# Patient Record
Sex: MALE | Race: WHITE | NOT HISPANIC OR LATINO | Employment: FULL TIME | URBAN - METROPOLITAN AREA
[De-identification: names, ages, dates, MRNs, and addresses within clinical notes are randomized per-mention and may not be internally consistent; named-entity substitution may affect disease eponyms.]

---

## 2017-06-01 ENCOUNTER — HOSPITAL ENCOUNTER (OUTPATIENT)
Facility: HOSPITAL | Age: 42
Discharge: HOME | End: 2017-06-02
Attending: EMERGENCY MEDICINE | Admitting: EMERGENCY MEDICINE
Payer: OTHER GOVERNMENT

## 2017-06-01 DIAGNOSIS — I10 UNCONTROLLED HYPERTENSION: ICD-10-CM

## 2017-06-01 DIAGNOSIS — R07.9 CHEST PAIN IN ADULT: Primary | ICD-10-CM

## 2017-06-01 DIAGNOSIS — I10 ESSENTIAL HYPERTENSION: ICD-10-CM

## 2017-06-01 LAB
ALBUMIN SERPL BCP-MCNC: 4.2 G/DL
ALP SERPL-CCNC: 75 U/L
ALT SERPL W/O P-5'-P-CCNC: 55 U/L
ANION GAP SERPL CALC-SCNC: 9 MMOL/L
AST SERPL-CCNC: 33 U/L
BASOPHILS # BLD AUTO: 0.03 K/UL
BASOPHILS NFR BLD: 0.4 %
BILIRUB SERPL-MCNC: 0.4 MG/DL
BILIRUB UR QL STRIP: NEGATIVE
BUN SERPL-MCNC: 12 MG/DL
CALCIUM SERPL-MCNC: 9.6 MG/DL
CHLORIDE SERPL-SCNC: 108 MMOL/L
CLARITY UR: CLEAR
CO2 SERPL-SCNC: 24 MMOL/L
COLOR UR: YELLOW
CREAT SERPL-MCNC: 0.9 MG/DL
DIFFERENTIAL METHOD: ABNORMAL
EOSINOPHIL # BLD AUTO: 0.2 K/UL
EOSINOPHIL NFR BLD: 2.2 %
ERYTHROCYTE [DISTWIDTH] IN BLOOD BY AUTOMATED COUNT: 12.7 %
EST. GFR  (AFRICAN AMERICAN): >60 ML/MIN/1.73 M^2
EST. GFR  (NON AFRICAN AMERICAN): >60 ML/MIN/1.73 M^2
GLUCOSE SERPL-MCNC: 117 MG/DL
GLUCOSE UR QL STRIP: NEGATIVE
HCT VFR BLD AUTO: 42.8 %
HGB BLD-MCNC: 15.1 G/DL
HGB UR QL STRIP: NEGATIVE
KETONES UR QL STRIP: NEGATIVE
LEUKOCYTE ESTERASE UR QL STRIP: NEGATIVE
LYMPHOCYTES # BLD AUTO: 2.4 K/UL
LYMPHOCYTES NFR BLD: 32 %
MCH RBC QN AUTO: 31.1 PG
MCHC RBC AUTO-ENTMCNC: 35.3 %
MCV RBC AUTO: 88 FL
MONOCYTES # BLD AUTO: 0.6 K/UL
MONOCYTES NFR BLD: 8.6 %
NEUTROPHILS # BLD AUTO: 4.2 K/UL
NEUTROPHILS NFR BLD: 56.7 %
NITRITE UR QL STRIP: NEGATIVE
PH UR STRIP: 6 [PH] (ref 5–8)
PLATELET # BLD AUTO: 242 K/UL
PMV BLD AUTO: 10.8 FL
POTASSIUM SERPL-SCNC: 4 MMOL/L
PROT SERPL-MCNC: 7.6 G/DL
PROT UR QL STRIP: NEGATIVE
RBC # BLD AUTO: 4.85 M/UL
SODIUM SERPL-SCNC: 141 MMOL/L
SP GR UR STRIP: 1.02 (ref 1–1.03)
TROPONIN I SERPL DL<=0.01 NG/ML-MCNC: <0.006 NG/ML
TROPONIN I SERPL DL<=0.01 NG/ML-MCNC: <0.006 NG/ML
URN SPEC COLLECT METH UR: NORMAL
UROBILINOGEN UR STRIP-ACNC: NEGATIVE EU/DL
WBC # BLD AUTO: 7.35 K/UL

## 2017-06-01 PROCEDURE — 96374 THER/PROPH/DIAG INJ IV PUSH: CPT

## 2017-06-01 PROCEDURE — G0378 HOSPITAL OBSERVATION PER HR: HCPCS

## 2017-06-01 PROCEDURE — 25000003 PHARM REV CODE 250: Performed by: NURSE PRACTITIONER

## 2017-06-01 PROCEDURE — 81003 URINALYSIS AUTO W/O SCOPE: CPT

## 2017-06-01 PROCEDURE — 85025 COMPLETE CBC W/AUTO DIFF WBC: CPT

## 2017-06-01 PROCEDURE — 25000003 PHARM REV CODE 250: Performed by: INTERNAL MEDICINE

## 2017-06-01 PROCEDURE — 84484 ASSAY OF TROPONIN QUANT: CPT

## 2017-06-01 PROCEDURE — 80053 COMPREHEN METABOLIC PANEL: CPT

## 2017-06-01 PROCEDURE — 25000003 PHARM REV CODE 250: Performed by: EMERGENCY MEDICINE

## 2017-06-01 PROCEDURE — 36415 COLL VENOUS BLD VENIPUNCTURE: CPT

## 2017-06-01 PROCEDURE — 93005 ELECTROCARDIOGRAM TRACING: CPT

## 2017-06-01 PROCEDURE — 84484 ASSAY OF TROPONIN QUANT: CPT | Mod: 91

## 2017-06-01 PROCEDURE — 99285 EMERGENCY DEPT VISIT HI MDM: CPT | Mod: 25

## 2017-06-01 RX ORDER — ONDANSETRON 2 MG/ML
4 INJECTION INTRAMUSCULAR; INTRAVENOUS EVERY 8 HOURS PRN
Status: DISCONTINUED | OUTPATIENT
Start: 2017-06-01 | End: 2017-06-02 | Stop reason: HOSPADM

## 2017-06-01 RX ORDER — CLONIDINE HYDROCHLORIDE 0.1 MG/1
0.1 TABLET ORAL ONCE
Status: COMPLETED | OUTPATIENT
Start: 2017-06-01 | End: 2017-06-01

## 2017-06-01 RX ORDER — LISINOPRIL 10 MG/1
10 TABLET ORAL DAILY
COMMUNITY
End: 2017-07-29

## 2017-06-01 RX ORDER — LISINOPRIL 20 MG/1
20 TABLET ORAL DAILY
Status: DISCONTINUED | OUTPATIENT
Start: 2017-06-02 | End: 2017-06-02 | Stop reason: HOSPADM

## 2017-06-01 RX ORDER — SODIUM CHLORIDE 0.9 % (FLUSH) 0.9 %
3 SYRINGE (ML) INJECTION EVERY 8 HOURS PRN
Status: DISCONTINUED | OUTPATIENT
Start: 2017-06-01 | End: 2017-06-02 | Stop reason: HOSPADM

## 2017-06-01 RX ORDER — ASPIRIN 325 MG
325 TABLET ORAL DAILY
Status: DISCONTINUED | OUTPATIENT
Start: 2017-06-02 | End: 2017-06-02 | Stop reason: HOSPADM

## 2017-06-01 RX ORDER — ENALAPRILAT 1.25 MG/ML
0.62 INJECTION INTRAVENOUS
Status: COMPLETED | OUTPATIENT
Start: 2017-06-01 | End: 2017-06-01

## 2017-06-01 RX ORDER — ASPIRIN 325 MG
325 TABLET ORAL
Status: COMPLETED | OUTPATIENT
Start: 2017-06-01 | End: 2017-06-01

## 2017-06-01 RX ORDER — RAMELTEON 8 MG/1
8 TABLET ORAL NIGHTLY PRN
Status: DISCONTINUED | OUTPATIENT
Start: 2017-06-01 | End: 2017-06-02 | Stop reason: HOSPADM

## 2017-06-01 RX ORDER — MORPHINE SULFATE 10 MG/ML
2 INJECTION INTRAMUSCULAR; INTRAVENOUS; SUBCUTANEOUS EVERY 4 HOURS PRN
Status: DISCONTINUED | OUTPATIENT
Start: 2017-06-01 | End: 2017-06-02 | Stop reason: HOSPADM

## 2017-06-01 RX ORDER — LISINOPRIL 5 MG/1
10 TABLET ORAL DAILY
Status: DISCONTINUED | OUTPATIENT
Start: 2017-06-02 | End: 2017-06-01

## 2017-06-01 RX ADMIN — RAMELTEON 8 MG: 8 TABLET, FILM COATED ORAL at 10:06

## 2017-06-01 RX ADMIN — CLONIDINE HYDROCHLORIDE 0.1 MG: 0.1 TABLET ORAL at 10:06

## 2017-06-01 RX ADMIN — ASPIRIN 325 MG ORAL TABLET 325 MG: 325 PILL ORAL at 03:06

## 2017-06-01 RX ADMIN — ENALAPRILAT 0.62 MG: 1.25 INJECTION, SOLUTION INTRAVENOUS at 03:06

## 2017-06-01 NOTE — ED TRIAGE NOTES
Pt arrives per ems c/o intermittent chest tightness that radiates to his neck at times.  States he has HTN and his pressure has been getting higher as of late.  Today he went to the clinic today and they checked his pressure and stated it was really high so he came to the ED.  Inna patterson, ha, miles.

## 2017-06-01 NOTE — ED PROVIDER NOTES
"Encounter Date: 6/1/2017    SCRIBE #1 NOTE: I, Kym Moran, am scribing for, and in the presence of,  Jean Carlos Mcguire MD. I have scribed the following portions of the note - Other sections scribed: HPI/ROS.       History     Chief Complaint   Patient presents with    Chest Pain     Pt c/o intermitent chest pain off and on for the past 2 days, currently denies CP.      Review of patient's allergies indicates:  No Known Allergies  CC: Chest Pain    HPI: This 42 y.o. male with hypertension presents to the ED c/o a two week history of intermittent mid sternal chest pain that occasionally radiates up the left side of his neck.  The patient describes the pain as a "pressure" that is exacerbated with exertion.  [place] and found his blood pressure was high.  He has been compliant with his lisinopril for the past 10 years with no complications.  The patient denies shortness of breath, fever, nausea or any other associated symptoms.         The history is provided by the patient.     Past Medical History:   Diagnosis Date    Hypertension      Past Surgical History:   Procedure Laterality Date    SHOULDER SURGERY       History reviewed. No pertinent family history.  Social History   Substance Use Topics    Smoking status: Never Smoker    Smokeless tobacco: Current User     Types: Snuff    Alcohol use 2.4 oz/week     4 Cans of beer per week     Review of Systems   Constitutional: Negative for fever.   HENT: Negative for ear pain and sore throat.    Eyes: Negative for visual disturbance.   Respiratory: Negative for shortness of breath.    Cardiovascular: Positive for chest pain.   Gastrointestinal: Negative for abdominal pain, nausea and vomiting.   Genitourinary: Negative for dysuria.   Musculoskeletal: Negative for back pain and neck pain.   Neurological: Negative for headaches.   All other systems reviewed and are negative.      Physical Exam     Initial Vitals " [06/01/17 1420]   BP Pulse Resp Temp SpO2   (!) 182/126 87 18 98.7 °F (37.1 °C) 96 %     Physical Exam  Nursing note and vitals reviewed.  Constitutional: Well-appearing middle-age male in no obvious distress.  HENT:    Head: NC/AT    Eyes: Conjunctivae normal.  (-) scleral icterus.              Mouth/Throat: MMM   Neck: Neck supple, normal rom.  Cardiovascular: RRR   Pulmonary/Chest: CTAB   Abdomen:  Soft, ND/NT.  (-) CVA tenderness.  Musculoskeletal:  FROM of all major joints. No apparent edema or tenderness.  Neurological: A&O x4.  No acute focal motor deficits.    Skin: Skin is intact, warm and dry.  No rash.  Psychiatric: normal mood and affect.      ED Course   Procedures  Labs Reviewed   CBC W/ AUTO DIFFERENTIAL - Abnormal; Notable for the following:        Result Value    MCH 31.1 (*)     All other components within normal limits   COMPREHENSIVE METABOLIC PANEL - Abnormal; Notable for the following:     Glucose 117 (*)     ALT 55 (*)     All other components within normal limits   TROPONIN I   URINALYSIS     EKG Readings: (Independently Interpreted)   Initial Reading: No STEMI.   Normal sinus rhythm, rate 81, normal axis/intervals, no ST/T-wave abnormalities.     X-Rays:   Independently Interpreted Readings:   Chest X-Ray: Normal heart size.  No infiltrates.  No acute abnormalities.     Medical Decision Making:   History:   I obtained history from: someone other than patient.  Old Medical Records: I decided to obtain old medical records.  Old Records Summarized: records from clinic visits and other records.  Independently Interpreted Test(s):   I have ordered and independently interpreted X-rays - see prior notes.  I have ordered and independently interpreted EKG Reading(s) - see prior notes  Clinical Tests:   Lab Tests: Ordered and Reviewed  Radiological Study: Ordered and Reviewed  Medical Tests: Ordered and Reviewed    Differential Diagnosis:   Initial differential diagnosis includes but is not limited  to... ACS/MI, aortic dissection, pericarditis, pericardial effusion, PE, pneumothorax, pneumonia, costochondritis, gastritis, GERD, pancreatitis.     Additional Medical Decision Making:   Emergent evaluation of a 42 y.o. male with history of hypertension who presents to the Emergency Department via EMS complaining of intermittent left-sided/substernal chest pain for the past 3-4 weeks.  Physical exam unremarkable.  VS concerning for hypertension.  Basic labs including cardiac enzymes are unremarkable.   There is no evidence of acute ischemia and/or dysrhythmia on EKG - troponin negative.  Despite his unremarkable workup thus far, the remains concern for ACS.  Consequently,  he will be admitted to medicine for further evaluation and management including serial cardiac enzymes/EKGs and continuous cardiac monitoring in an effort to r/o ACS.             Scribe Attestation:   Scribe #1: I performed the above scribed service and the documentation accurately describes the services I performed. I attest to the accuracy of the note.    Attending Attestation:           Physician Attestation for Scribe:  Physician Attestation Statement for Scribe #1: I, Jean Carlos Mcguire MD, reviewed documentation, as scribed by Kym Moran in my presence, and it is both accurate and complete.                 ED Course     Clinical Impression:   The primary encounter diagnosis was Chest pain in adult. A diagnosis of Uncontrolled hypertension was also pertinent to this visit.          Jean Carlos Mcguire MD  06/01/17 1510       Jean Carlos Mcguire MD  06/01/17 0661

## 2017-06-02 VITALS
HEART RATE: 65 BPM | TEMPERATURE: 99 F | OXYGEN SATURATION: 96 % | DIASTOLIC BLOOD PRESSURE: 66 MMHG | WEIGHT: 130 LBS | SYSTOLIC BLOOD PRESSURE: 125 MMHG | BODY MASS INDEX: 17.61 KG/M2 | RESPIRATION RATE: 18 BRPM | HEIGHT: 72 IN

## 2017-06-02 LAB
ALBUMIN SERPL BCP-MCNC: 3.9 G/DL
ALP SERPL-CCNC: 65 U/L
ALT SERPL W/O P-5'-P-CCNC: 52 U/L
ANION GAP SERPL CALC-SCNC: 9 MMOL/L
AST SERPL-CCNC: 28 U/L
BASOPHILS # BLD AUTO: 0.02 K/UL
BASOPHILS NFR BLD: 0.2 %
BILIRUB SERPL-MCNC: 0.6 MG/DL
BUN SERPL-MCNC: 18 MG/DL
CALCIUM SERPL-MCNC: 9.3 MG/DL
CHLORIDE SERPL-SCNC: 106 MMOL/L
CHOLEST/HDLC SERPL: 5.6 {RATIO}
CO2 SERPL-SCNC: 26 MMOL/L
CREAT SERPL-MCNC: 0.9 MG/DL
DIASTOLIC DYSFUNCTION: NO
DIFFERENTIAL METHOD: NORMAL
EOSINOPHIL # BLD AUTO: 0.3 K/UL
EOSINOPHIL NFR BLD: 2.8 %
ERYTHROCYTE [DISTWIDTH] IN BLOOD BY AUTOMATED COUNT: 12.8 %
EST. GFR  (AFRICAN AMERICAN): >60 ML/MIN/1.73 M^2
EST. GFR  (NON AFRICAN AMERICAN): >60 ML/MIN/1.73 M^2
GLUCOSE SERPL-MCNC: 103 MG/DL
HCT VFR BLD AUTO: 42.6 %
HDL/CHOLESTEROL RATIO: 18 %
HDLC SERPL-MCNC: 200 MG/DL
HDLC SERPL-MCNC: 36 MG/DL
HGB BLD-MCNC: 14.3 G/DL
INR PPP: 1.1
LDLC SERPL CALC-MCNC: 124 MG/DL
LYMPHOCYTES # BLD AUTO: 3.1 K/UL
LYMPHOCYTES NFR BLD: 34.7 %
MCH RBC QN AUTO: 30.2 PG
MCHC RBC AUTO-ENTMCNC: 33.6 %
MCV RBC AUTO: 90 FL
MONOCYTES # BLD AUTO: 0.7 K/UL
MONOCYTES NFR BLD: 8.1 %
NEUTROPHILS # BLD AUTO: 4.8 K/UL
NEUTROPHILS NFR BLD: 54.2 %
NONHDLC SERPL-MCNC: 164 MG/DL
PLATELET # BLD AUTO: 227 K/UL
PMV BLD AUTO: 10.9 FL
POTASSIUM SERPL-SCNC: 4 MMOL/L
PROT SERPL-MCNC: 6.9 G/DL
PROTHROMBIN TIME: 11.5 SEC
RBC # BLD AUTO: 4.74 M/UL
SODIUM SERPL-SCNC: 141 MMOL/L
TRIGL SERPL-MCNC: 200 MG/DL
TROPONIN I SERPL DL<=0.01 NG/ML-MCNC: <0.006 NG/ML
TSH SERPL DL<=0.005 MIU/L-ACNC: 2.34 UIU/ML
WBC # BLD AUTO: 8.88 K/UL

## 2017-06-02 PROCEDURE — 80053 COMPREHEN METABOLIC PANEL: CPT

## 2017-06-02 PROCEDURE — 84443 ASSAY THYROID STIM HORMONE: CPT

## 2017-06-02 PROCEDURE — 93017 CV STRESS TEST TRACING ONLY: CPT

## 2017-06-02 PROCEDURE — 85610 PROTHROMBIN TIME: CPT

## 2017-06-02 PROCEDURE — 85025 COMPLETE CBC W/AUTO DIFF WBC: CPT

## 2017-06-02 PROCEDURE — 36415 COLL VENOUS BLD VENIPUNCTURE: CPT

## 2017-06-02 PROCEDURE — 80061 LIPID PANEL: CPT

## 2017-06-02 PROCEDURE — 25000003 PHARM REV CODE 250: Performed by: EMERGENCY MEDICINE

## 2017-06-02 PROCEDURE — 84484 ASSAY OF TROPONIN QUANT: CPT

## 2017-06-02 PROCEDURE — 93018 CV STRESS TEST I&R ONLY: CPT | Mod: ,,, | Performed by: INTERNAL MEDICINE

## 2017-06-02 PROCEDURE — G0378 HOSPITAL OBSERVATION PER HR: HCPCS

## 2017-06-02 PROCEDURE — 93016 CV STRESS TEST SUPVJ ONLY: CPT | Mod: ,,, | Performed by: INTERNAL MEDICINE

## 2017-06-02 PROCEDURE — 78452 HT MUSCLE IMAGE SPECT MULT: CPT | Mod: 26,,, | Performed by: INTERNAL MEDICINE

## 2017-06-02 PROCEDURE — 25000003 PHARM REV CODE 250: Performed by: NURSE PRACTITIONER

## 2017-06-02 RX ORDER — LISINOPRIL 20 MG/1
20 TABLET ORAL DAILY
Qty: 30 TABLET | Refills: 3 | Status: SHIPPED | OUTPATIENT
Start: 2017-06-02 | End: 2018-06-02

## 2017-06-02 RX ADMIN — ASPIRIN 325 MG ORAL TABLET 325 MG: 325 PILL ORAL at 12:06

## 2017-06-02 RX ADMIN — LISINOPRIL 20 MG: 20 TABLET ORAL at 12:06

## 2017-06-02 NOTE — PLAN OF CARE
Problem: Patient Care Overview  Goal: Plan of Care Review  Outcome: Ongoing (interventions implemented as appropriate)   06/01/17 2117   Coping/Psychosocial   Plan Of Care Reviewed With patient   Pt remained free of falls during current shift. Monitoring pt's blood pressure and clonidine added to medication. Pt remained NPO since midnight and will have a stress test in AM. Denied pain and did not receive any prn pain medications. Plan of care and fall precautions reviewed with pt and verbalized understanding. Bed locked, lowered, SR up x2 and call light placed within reach.

## 2017-06-02 NOTE — PLAN OF CARE
06/02/17 1411   Final Note   Assessment Type Final Discharge Note   Discharge Disposition Home   Discharge planning education complete? Yes   Hospital Follow Up  Appt(s) scheduled? Yes   Discharge plans and expectations educations in teach back method with documentation complete? Yes   Offered [facility] -- Bedside Delivery? n/a   Discharge/Hospital Encounter Summary to (non-[facility]) PCP n/a   Referral to Outpatient Case Management complete? n/a   Referral to / orders for Home Health Complete? n/a   30 day supply of medicines given at discharge, if documented non-compliance / non-adherence? n/a   Any social issues identified prior to discharge? n/a   Did you assess the readiness or willingness of the family or caregiver to support self management of care? n/a   Right Care Referral Info   Post Acute Recommendation No Care     Follow-up Information     Schedule an appointment as soon as possible [place]               Call placed to pts nurse Chand to notify her that all CM needs have been addressed and that she can proceed with nursing d/c instructions to complete d/c processs

## 2017-06-02 NOTE — NURSING
Pt received from ED assisted by transport via wheelchair. Ambulated to bed and bed locked, lowered, SR up x2. Vitals obtained and documented. Tele monitor initiated. Pt is AAO x4. Resp are even, unlabored, diminished on room air. Pt is presenting SR on tele monitor. Abd is contour, active x4 quads and reports having LBM on 6/1. Skin is clean, dry and intact. 18g PIV to RH saline locked. Pt denied pain at current time and is in NAD. Assessment completed and documented. See doc flow sheet. Plan of care reviewed with pt and pt verbalized understanding. Call light placed within reach. Will continue to monitor pt closely.

## 2017-06-02 NOTE — ASSESSMENT & PLAN NOTE
Uncontrolled on current medication regimen. Will continue lisinopril and adjust for better BP control. Clonidine x 1 dose.

## 2017-06-02 NOTE — SUBJECTIVE & OBJECTIVE
Past Medical History:   Diagnosis Date    Hypertension        Past Surgical History:   Procedure Laterality Date    SHOULDER SURGERY Right        Review of patient's allergies indicates:  No Known Allergies    No current facility-administered medications on file prior to encounter.      No current outpatient prescriptions on file prior to encounter.     Family History     Problem Relation (Age of Onset)    Heart disease Father    No Known Problems Mother        Social History Main Topics    Smoking status: Never Smoker    Smokeless tobacco: Current User     Types: Snuff    Alcohol use 2.4 oz/week     4 Cans of beer per week      Comment: 4 nights/week    Drug use: No    Sexual activity: Yes     Review of Systems   Constitutional: Negative for chills, diaphoresis and fever.   HENT: Negative for congestion, postnasal drip, sinus pressure and sore throat.    Eyes: Negative for visual disturbance.   Respiratory: Negative for cough, chest tightness, shortness of breath and wheezing.    Cardiovascular: Positive for chest pain (left sided chset pain radiating up neck ). Negative for palpitations and leg swelling.   Gastrointestinal: Negative for abdominal distention, abdominal pain, blood in stool, constipation, diarrhea, nausea and vomiting.   Endocrine: Negative.    Genitourinary: Negative for dysuria.   Musculoskeletal: Negative.    Skin: Negative.    Allergic/Immunologic: Negative.    Neurological: Positive for headaches (he assoicates with elevated BP). Negative for dizziness, weakness and numbness.   Hematological: Negative.    Psychiatric/Behavioral: Negative.      Objective:     Vital Signs (Most Recent):  Temp: 98.5 °F (36.9 °C) (06/01/17 2124)  Pulse: 73 (06/01/17 2124)  Resp: 18 (06/01/17 2124)  BP: (!) 166/110 (06/01/17 2153)  SpO2: 97 % (06/01/17 2124) Vital Signs (24h Range):  Temp:  [98.4 °F (36.9 °C)-98.7 °F (37.1 °C)] 98.5 °F (36.9 °C)  Pulse:  [62-87] 73  Resp:  [18] 18  SpO2:  [96 %-97 %] 97  %  BP: (136-182)/() 166/110     Weight: 59 kg (130 lb)  Body mass index is 17.63 kg/m².    Physical Exam   Constitutional: He is oriented to person, place, and time. He appears well-developed and well-nourished. He is cooperative.  Non-toxic appearance. No distress.   HENT:   Head: Normocephalic and atraumatic.   Eyes: Conjunctivae and lids are normal. Pupils are equal, round, and reactive to light.   Neck: Trachea normal, normal range of motion and full passive range of motion without pain. Neck supple. Normal carotid pulses and no JVD present. No tracheal deviation present. No thyroid mass and no thyromegaly present.   Cardiovascular: Normal rate, regular rhythm, S1 normal, S2 normal, normal heart sounds and normal pulses.    Pulmonary/Chest: Effort normal and breath sounds normal. No stridor.   Abdominal: Soft. Normal appearance and bowel sounds are normal. There is no tenderness.   Musculoskeletal: Normal range of motion.   Neurological: He is alert and oriented to person, place, and time. He has normal strength. No cranial nerve deficit or sensory deficit.   Skin: Skin is warm, dry and intact. He is not diaphoretic. No cyanosis. Nails show no clubbing.   Psychiatric: He has a normal mood and affect. His speech is normal and behavior is normal. Judgment and thought content normal. Cognition and memory are normal.        Significant Labs:   CBC:   Recent Labs  Lab 06/01/17  1455   WBC 7.35   HGB 15.1   HCT 42.8        CMP:   Recent Labs  Lab 06/01/17  1455      K 4.0      CO2 24   *   BUN 12   CREATININE 0.9   CALCIUM 9.6   PROT 7.6   ALBUMIN 4.2   BILITOT 0.4   ALKPHOS 75   AST 33   ALT 55*   ANIONGAP 9   EGFRNONAA >60     Troponin:   Recent Labs  Lab 06/01/17  1455   TROPONINI <0.006     TSH: No results for input(s): TSH in the last 4320 hours.    Significant Imaging:   Imaging Results          X-Ray Chest AP Portable (Final result)  Result time 06/01/17 15:00:09    Final result  by Robe Kim MD (06/01/17 15:00:09)                 Impression:     No acute cardiopulmonary disease      Electronically signed by: ROBE KIM MD  Date:     06/01/17  Time:    15:00              Narrative:    Portable AP view of the chest was obtained.  Comparison -- none. Heart size is normal. Mediastinal contour is unremarkable. The lungs are expanded and clear. No lung consolidation, pleural effusion, or pneumothorax is seen. The skeletal structures are intact.

## 2017-06-02 NOTE — HPI
"Moris Jones is a 42 y.o. male hypertension who presented [place] the ED [place] of elevate BP.  He also reports HA and intermittent left sided chest pain radiating up into neck 4/5. He describes the chest pain as "tightness" and states pain is exacerbated with activity and resolved with rest. He denies fever, chills, diaphoresis, palpitations, NVD, recent trauma and illness. States he's been on lisinopril 10 mg for ~ 10 years and blood pressures are usually controlled. He tells me last couple of weeks have busy and thinks this maybe contributory.  Reports he chews smokless tobacco x 20+ years and is a moderate drinker (4 cans of beer) 4 days a week.  Pertinent FH - dad(d) - MI. Found in ED to have non-ischemic EKG.  Initial troponin negative. BNP wnl. CXR is non-revealing.  Admitted to OBS for ACS rule out.  No previous cardiac issues or recent w/u. He denies recurrent CP and is non-distressed.                                             "

## 2017-06-02 NOTE — PLAN OF CARE
06/02/17 1000   Discharge Assessment   Assessment Type Discharge Planning Assessment   Confirmed/corrected address and phone number on facesheet? Yes   Assessment information obtained from? Patient   Prior to hospitilization cognitive status: Alert/Oriented   Prior to hospitalization functional status: Independent   Current cognitive status: Alert/Oriented   Current Functional Status: Independent   Arrived From home or self-care   Lives With spouse   Able to Return to Prior Arrangements yes   Is patient able to care for self after discharge? Yes   How many people do you have in your home that can help with your care after discharge? 1   Who are your caregiver(s) and their phone number(s)? jose-Sandra- [phone]   Patient's perception of discharge disposition home or selfcare   Readmission Within The Last 30 Days no previous admission in last 30 days   Patient currently being followed by outpatient case management? No   Patient currently receives home health services? No   Does the patient currently use HME? No   Patient currently receives private duty nursing? N/A   Patient currently receives any other outside agency services? No   Equipment Currently Used at Home none   Do you have any problems affording any of your prescribed medications? No   Is the patient taking medications as prescribed? yes   Do you have any financial concerns preventing you from receiving the healthcare you need? No   Does the patient have transportation to healthcare appointments? Yes   Transportation Available car;family or friend will provide   On Dialysis? No   Does the patient receive services at the Coumadin Clinic? No   Are there any open cases? No   Discharge Plan A Home with family   Patient/Family In Agreement With Plan yes

## 2017-06-02 NOTE — H&P
"[facility] Medicine  History & Physical    Patient Name: Moris Jones  MRN: [id number]  Admission Date: 6/1/2017  Attending Physician: Berkley Bowers MD   Primary Care Provider: No primary care provider on file.         Patient information was obtained from patient and ER records.     Subjective:     Principal Problem:Chest pain in adult    Chief Complaint:   Chief Complaint   Patient presents with    Chest Pain     Pt c/o intermitent chest pain off and on for the past 2 days, currently denies CP.         HPI: Moris Jones is a 42 y.o. male hypertension who presented to the ED via EMS [place] [employer] [place] for evaluation of elevate BP.  He also reports HA and intermittent left sided chest pain radiating up into neck 4/5. He describes the chest pain as "tightness" and states pain is exacerbated with activity and resolved with rest. He denies fever, chills, diaphoresis, palpitations, NVD, recent trauma and illness. States he's been on lisinopril 10 mg for ~ 10 years and blood pressures are usually controlled. He tells me last couple of weeks have busy and thinks this maybe contributory.  Reports he chews smokless tobacco x 20+ years and is a moderate drinker (4 cans of beer) 4 days a week.  Pertinent FH - dad(d) - MI. Found in ED to have non-ischemic EKG.  Initial troponin negative. BNP wnl. CXR is non-revealing.  Admitted to OBS for ACS rule out.  No previous cardiac issues or recent w/u. He denies recurrent CP and is non-distressed.                                               Past Medical History:   Diagnosis Date    Hypertension        Past Surgical History:   Procedure Laterality Date    SHOULDER SURGERY Right        Review of patient's allergies indicates:  No Known Allergies    No current facility-administered medications on file prior to encounter.      No current outpatient prescriptions on file prior to encounter.     Family History     Problem Relation (Age of Onset) "    Heart disease Father    No Known Problems Mother        Social History Main Topics    Smoking status: Never Smoker    Smokeless tobacco: Current User     Types: Snuff    Alcohol use 2.4 oz/week     4 Cans of beer per week      Comment: 4 nights/week    Drug use: No    Sexual activity: Yes     Review of Systems   Constitutional: Negative for chills, diaphoresis and fever.   HENT: Negative for congestion, postnasal drip, sinus pressure and sore throat.    Eyes: Negative for visual disturbance.   Respiratory: Negative for cough, chest tightness, shortness of breath and wheezing.    Cardiovascular: Positive for chest pain (left sided chset pain radiating up neck ). Negative for palpitations and leg swelling.   Gastrointestinal: Negative for abdominal distention, abdominal pain, blood in stool, constipation, diarrhea, nausea and vomiting.   Endocrine: Negative.    Genitourinary: Negative for dysuria.   Musculoskeletal: Negative.    Skin: Negative.    Allergic/Immunologic: Negative.    Neurological: Positive for headaches (he assoicates with elevated BP). Negative for dizziness, weakness and numbness.   Hematological: Negative.    Psychiatric/Behavioral: Negative.      Objective:     Vital Signs (Most Recent):  Temp: 98.5 °F (36.9 °C) (06/01/17 2124)  Pulse: 73 (06/01/17 2124)  Resp: 18 (06/01/17 2124)  BP: (!) 166/110 (06/01/17 2153)  SpO2: 97 % (06/01/17 2124) Vital Signs (24h Range):  Temp:  [98.4 °F (36.9 °C)-98.7 °F (37.1 °C)] 98.5 °F (36.9 °C)  Pulse:  [62-87] 73  Resp:  [18] 18  SpO2:  [96 %-97 %] 97 %  BP: (136-182)/() 166/110     Weight: 59 kg (130 lb)  Body mass index is 17.63 kg/m².    Physical Exam   Constitutional: He is oriented to person, place, and time. He appears well-developed and well-nourished. He is cooperative.  Non-toxic appearance. No distress.   HENT:   Head: Normocephalic and atraumatic.   Eyes: Conjunctivae and lids are normal. Pupils are equal, round, and reactive to light.    Neck: Trachea normal, normal range of motion and full passive range of motion without pain. Neck supple. Normal carotid pulses and no JVD present. No tracheal deviation present. No thyroid mass and no thyromegaly present.   Cardiovascular: Normal rate, regular rhythm, S1 normal, S2 normal, normal heart sounds and normal pulses.    Pulmonary/Chest: Effort normal and breath sounds normal. No stridor.   Abdominal: Soft. Normal appearance and bowel sounds are normal. There is no tenderness.   Musculoskeletal: Normal range of motion.   Neurological: He is alert and oriented to person, place, and time. He has normal strength. No cranial nerve deficit or sensory deficit.   Skin: Skin is warm, dry and intact. He is not diaphoretic. No cyanosis. Nails show no clubbing.   Psychiatric: He has a normal mood and affect. His speech is normal and behavior is normal. Judgment and thought content normal. Cognition and memory are normal.        Significant Labs:   CBC:   Recent Labs  Lab 06/01/17  1455   WBC 7.35   HGB 15.1   HCT 42.8        CMP:   Recent Labs  Lab 06/01/17  1455      K 4.0      CO2 24   *   BUN 12   CREATININE 0.9   CALCIUM 9.6   PROT 7.6   ALBUMIN 4.2   BILITOT 0.4   ALKPHOS 75   AST 33   ALT 55*   ANIONGAP 9   EGFRNONAA >60     Troponin:   Recent Labs  Lab 06/01/17  1455   TROPONINI <0.006     TSH: No results for input(s): TSH in the last 4320 hours.    Significant Imaging:   Imaging Results          X-Ray Chest AP Portable (Final result)  Result time 06/01/17 15:00:09    Final result by Robe Kim MD (06/01/17 15:00:09)                 Impression:     No acute cardiopulmonary disease      Electronically signed by: ROBE KIM MD  Date:     06/01/17  Time:    15:00              Narrative:    Portable AP view of the chest was obtained.  Comparison -- none. Heart size is normal. Mediastinal contour is unremarkable. The lungs are expanded and clear. No lung consolidation,  pleural effusion, or pneumothorax is seen. The skeletal structures are intact.                                  Assessment/Plan:     * Chest pain in adult    Admitted for rule out ACS.  Ischemic work-up (-) to date. EKG is non-ischemic. Initial troponin level negative. BNP wnl. Plan for continuous cardiac monitoring. Continue to trend serial troponins q6 hours X 2. ASA/NTG 0.4 mg SL PRN CP. Exercise stress in AM. NPO after midnight. Consider cardiology consult if abnormal.               Essential hypertension    Uncontrolled on current medication regimen. Will continue lisinopril and adjust for better BP control. Clonidine x 1 dose.              VTE Risk Mitigation         Ordered     Medium Risk of VTE  Once      06/01/17 2050     Place sequential compression device  Until discontinued      06/01/17 2050     Place DAVONTE hose  Until discontinued      06/01/17 2050        PERLITA Mederos  Department of Hospital Medicine   [facility]

## 2017-06-02 NOTE — ASSESSMENT & PLAN NOTE
Admitted for rule out ACS.  Ischemic work-up (-) to date. EKG is non-ischemic. Initial troponin level negative. BNP wnl. Plan for continuous cardiac monitoring. Continue to trend serial troponins q6 hours X 2. ASA/NTG 0.4 mg SL PRN CP. Exercise stress in AM. NPO after midnight. Consider cardiology consult if abnormal.

## 2017-06-02 NOTE — ED NOTES
Received report from [facility]. 1st contact with pt. Pt AAO x 3, REU, skin warm, dry and intact. Side rails up x 2, bed in low position, wheels locked. Call bell within reach. Pt denies chest pain rated 0/10. Pt BP: 149/93. Pt has been updated on his care. Family at bedside.

## 2017-06-03 PROBLEM — I10 ESSENTIAL HYPERTENSION: Status: ACTIVE | Noted: 2017-06-03

## 2017-06-03 NOTE — DISCHARGE SUMMARY
"[facility] Hospital Medicine  Discharge Summary      Patient Name: Moris Jones  MRN: [id number]  Admission Date: 6/1/2017  Hospital Length of Stay: 0 days  Discharge Date and Time: 6/2/2017  3:17 PM  Attending Physician: No att. providers found   Discharging Provider: Brandi Stringer NP  Primary Care Provider: Primary Doctor No      HPI:   Moris Jones is a 42 y.o. male hypertension who presented to the ED via EMS from medic on the [facility] for evaluation of elevate BP.  He also reports HA and intermittent left sided chest pain radiating up into neck 4/5. He describes the chest pain as "tightness" and states pain is exacerbated with activity and resolved with rest. He denies fever, chills, diaphoresis, palpitations, NVD, recent trauma and illness. States he's been on lisinopril 10 mg for ~ 10 years and blood pressures are usually controlled. He tells me last couple of weeks have busy and thinks this maybe contributory.  Reports he chews smokless tobacco x 20+ years and is a moderate drinker (4 cans of beer) 4 days a week.  Pertinent FH - dad(d) - MI. Found in ED to have non-ischemic EKG.  Initial troponin negative. BNP wnl. CXR is non-revealing.  Admitted to OBS for ACS rule out.  No previous cardiac issues or recent w/u. He denies recurrent CP and is non-distressed.                                               * No surgery found *      Indwelling Lines/Drains at time of discharge:   Lines/Drains/Airways          No matching active lines, drains, or airways        Hospital Course:   Patient admitted for chest pain ACS ruled out.  NST no evidence of ischemia with LVEF 61%. Blood pressure improved with antihypertensive adjustment. Increase lisinopril 10 to 20 mg daily and instruct low salt diet. Keep blood pressure log and bring to PCP at [facility]. Patient stable and ready for discharge.      Consults:     Significant Diagnostic Studies: Labs: All labs within the past 24 hours have been " reviewed    Pending Diagnostic Studies:     Procedure Component Value Units Date/Time    NM Myocardial Perfusion Spect Multi Exer [[id number]] Resulted:  06/02/17 0844    Order Status:  Sent Lab Status:  In process Updated:  06/02/17 1105        Final Active Diagnoses:    Diagnosis Date Noted POA    PRINCIPAL PROBLEM:  Chest pain in adult [R07.9] 06/01/2017 Yes    Essential hypertension [I10] 06/01/2017 Unknown      Problems Resolved During this Admission:    Diagnosis Date Noted Date Resolved POA      No new Assessment & Plan notes have been filed under this hospital service since the last note was generated.  Service: Hospital Medicine      Discharged Condition: good    Disposition: Home or Self Care    Follow Up:  Follow-up Information     Schedule an appointment as soon as possible [place]               Patient Instructions:     Diet general   Order Specific Question Answer Comments   Additional restrictions: Cardiac (Low Na/Chol)      Activity as tolerated     Call MD for:  severe uncontrolled pain     Call MD for:  difficulty breathing or increased cough       Medications:  Reconciled Home Medications:   Discharge Medication List as of 6/2/2017  2:51 PM      CONTINUE these medications which have CHANGED    Details   !! lisinopril (PRINIVIL,ZESTRIL) 20 MG tablet Take 1 tablet (20 mg total) by mouth once daily., Starting Fri 6/2/2017, Until Sat 6/2/2018, Print       !! - Potential duplicate medications found. Please discuss with provider.      CONTINUE these medications which have NOT CHANGED    Details   !! lisinopril 10 MG tablet Take 10 mg by mouth once daily., Historical Med       !! - Potential duplicate medications found. Please discuss with provider.        Time spent on the discharge of patient: 20 minutes    Brandi Stringer NP  Department of Hospital Medicine  [facility]

## 2017-06-03 NOTE — HOSPITAL COURSE
Patient admitted for chest pain ACS ruled out.  NST no evidence of ischemia with LVEF 61%. Blood pressure improved with antihypertensive adjustment. Increase lisinopril 10 to 20 mg daily and instruct low salt diet. Keep blood pressure log and bring to PCP at [facility]. Patient stable and ready for discharge.

## 2017-07-29 ENCOUNTER — HOSPITAL ENCOUNTER (EMERGENCY)
Facility: HOSPITAL | Age: 42
Discharge: HOME | End: 2017-07-29
Attending: EMERGENCY MEDICINE
Payer: OTHER GOVERNMENT

## 2017-07-29 VITALS
BODY MASS INDEX: 32.51 KG/M2 | WEIGHT: 240 LBS | RESPIRATION RATE: 20 BRPM | OXYGEN SATURATION: 98 % | SYSTOLIC BLOOD PRESSURE: 123 MMHG | HEART RATE: 75 BPM | TEMPERATURE: 99 F | HEIGHT: 72 IN | DIASTOLIC BLOOD PRESSURE: 70 MMHG

## 2017-07-29 DIAGNOSIS — M54.9 BACK PAIN, UNSPECIFIED BACK LOCATION, UNSPECIFIED BACK PAIN LATERALITY, UNSPECIFIED CHRONICITY: Primary | ICD-10-CM

## 2017-07-29 PROCEDURE — 96372 THER/PROPH/DIAG INJ SC/IM: CPT

## 2017-07-29 PROCEDURE — 63600175 PHARM REV CODE 636 W HCPCS: Performed by: NURSE PRACTITIONER

## 2017-07-29 PROCEDURE — 99283 EMERGENCY DEPT VISIT LOW MDM: CPT | Mod: 25

## 2017-07-29 RX ORDER — PREDNISONE 20 MG/1
60 TABLET ORAL
Status: COMPLETED | OUTPATIENT
Start: 2017-07-29 | End: 2017-07-29

## 2017-07-29 RX ORDER — IBUPROFEN 200 MG
200 TABLET ORAL EVERY 6 HOURS PRN
COMMUNITY

## 2017-07-29 RX ORDER — PREDNISONE 20 MG/1
TABLET ORAL
Qty: 18 TABLET | Refills: 0 | Status: SHIPPED | OUTPATIENT
Start: 2017-07-29

## 2017-07-29 RX ORDER — KETOROLAC TROMETHAMINE 30 MG/ML
30 INJECTION, SOLUTION INTRAMUSCULAR; INTRAVENOUS
Status: COMPLETED | OUTPATIENT
Start: 2017-07-29 | End: 2017-07-29

## 2017-07-29 RX ORDER — HYDROCODONE BITARTRATE AND ACETAMINOPHEN 10; 325 MG/1; MG/1
1 TABLET ORAL EVERY 6 HOURS PRN
Qty: 10 TABLET | Refills: 0 | Status: SHIPPED | OUTPATIENT
Start: 2017-07-29

## 2017-07-29 RX ORDER — METHOCARBAMOL 500 MG/1
1000 TABLET, FILM COATED ORAL 3 TIMES DAILY
Qty: 30 TABLET | Refills: 0 | Status: SHIPPED | OUTPATIENT
Start: 2017-07-29 | End: 2017-08-03

## 2017-07-29 RX ORDER — ORPHENADRINE CITRATE 30 MG/ML
60 INJECTION INTRAMUSCULAR; INTRAVENOUS
Status: COMPLETED | OUTPATIENT
Start: 2017-07-29 | End: 2017-07-29

## 2017-07-29 RX ADMIN — KETOROLAC TROMETHAMINE 30 MG: 30 INJECTION, SOLUTION INTRAMUSCULAR at 07:07

## 2017-07-29 RX ADMIN — ORPHENADRINE CITRATE 30 MG: 30 INJECTION INTRAMUSCULAR; INTRAVENOUS at 07:07

## 2017-07-29 RX ADMIN — PREDNISONE 60 MG: 20 TABLET ORAL at 07:07

## 2017-07-29 NOTE — ED TRIAGE NOTES
Right lower back pain since last pm swimming has back problems had MRI and is scheduled for injections in August no radiation of pain

## 2017-07-29 NOTE — ED PROVIDER NOTES
"Encounter Date: 7/29/2017    SCRIBE #1 NOTE: I, Anny Bravo, am scribing for, and in the presence of,  Lanette Crawford NP. I have scribed the following portions of the note - Other sections scribed: HPI and ROS.       History     Chief Complaint   Patient presents with    Back Pain     " I started having lower back pain last night after swimming in the pool."      Chief Complaint: Back Pain    HPI: This 42 y.o. Male with HTN and a hx of chronic back pain presents to the ED c/o lower right side back pain. Patient has been suffering for symptoms x years. He was given a MRI in the past which presented "buldging disc". He states pain is normally mild and is treated with motrin.  He is scheduled for pain management (injections) in Aug. However, patient reports exacerbating symptoms last night after swimming and doing other relatively physical task throughout the day yesterday. Pain is now constant, severe and worse with positional movement.  Pt attempted motrin this am with no relief.  States "thats usually all it takes". Pain is non radiating. He denies numbness, weakness, bowel or bladder incontinence.       The history is provided by the patient. No  was used.     Review of patient's allergies indicates:  No Known Allergies  Past Medical History:   Diagnosis Date    Back problem     Hypertension      Past Surgical History:   Procedure Laterality Date    SHOULDER SURGERY Right      Family History   Problem Relation Age of Onset    No Known Problems Mother     Heart disease Father      Social History   Substance Use Topics    Smoking status: Never Smoker    Smokeless tobacco: Current User     Types: Snuff    Alcohol use 2.4 oz/week     4 Cans of beer per week      Comment: 4 nights/week     Review of Systems   Constitutional: Negative for chills and fever.   HENT: Negative for ear pain and sore throat.    Eyes: Negative for pain.   Respiratory: Negative for cough and shortness of breath. "    Genitourinary: Negative for dysuria.   Musculoskeletal: Positive for back pain. Myalgias: arm or leg pain.   Skin: Negative for rash.   Neurological: Negative for weakness and numbness.       Physical Exam     Initial Vitals [07/29/17 0636]   BP Pulse Resp Temp SpO2   123/70 75 20 99 °F (37.2 °C) 98 %      MAP       87.67         Physical Exam    Nursing note and vitals reviewed.  Constitutional: He appears well-developed and well-nourished.   HENT:   Head: Normocephalic.   Eyes: Conjunctivae and EOM are normal. Pupils are equal, round, and reactive to light.   Neck: Normal range of motion. Neck supple.   Musculoskeletal: Normal range of motion. He exhibits no edema or tenderness.   Neurological: He is alert and oriented to person, place, and time. He has normal strength and normal reflexes. He displays normal reflexes. No cranial nerve deficit or sensory deficit.   Skin: Skin is warm and dry. Capillary refill takes less than 2 seconds.   Psychiatric: He has a normal mood and affect.         ED Course   Procedures  Labs Reviewed - No data to display          Medical Decision Making:   Initial Assessment:   42-year-old male presents with right-sided back pain after crawling out of pool last night  Differential Diagnosis:   Muscle strain  Radiculopathy  Chronic back pain  ED Management:  Diagnosis management comments: This is an urgent evaluation of a 42-year-old male that presented to the ER with c/o right lower sided back pain .  Patient's exam as above.      Based on exam today - I have low suspicion for medical, surgical or other life threatening condition.  Patient reports that up until this point he has been able to control pain with Motrin.  I will give him an injection of Toradol and Norflex and start him on a prednisone taper.  Being he is going to be out of the area on a trip I am also giving him hydrocodone for breakthrough pain.  I've encouraged him to follow back up with the managing physician of his  back pain.    Pt, and wife, verbalizes understanding of d/c instructions and will return for worsening condition.    Case discussed with attending who agrees with A&P              Scribe Attestation:   Scribe #1: I performed the above scribed service and the documentation accurately describes the services I performed. I attest to the accuracy of the note.    Attending Attestation:     Physician Attestation Statement for NP/PA:   I discussed this assessment and plan of this patient with the NP/PA, but I did not personally examine the patient. The face to face encounter was performed by the NP/PA.    Other NP/PA Attestation Additions:      Medical Decision Making: Patient presents with low back pain highly consistent with musculoskeletal back strain. Will treat symptomatically and follow up PCP.        Physician Attestation for Scribe:  Physician Attestation Statement for Scribe #1: I, Lanette Crawford NP, reviewed documentation, as scribed by Anny Bravo in my presence, and it is both accurate and complete.                 ED Course     Clinical Impression:   The encounter diagnosis was Back pain, unspecified back location, unspecified back pain laterality, unspecified chronicity.    Disposition:   Disposition: Discharged  Condition: Stable                        Lanette Crawford NP  07/29/17 0817       Leroy Bal MD  08/04/17 [id number]

## 2019-04-26 ENCOUNTER — OCCUPATIONAL HEALTH (OUTPATIENT)
Dept: URGENT CARE | Facility: CLINIC | Age: 44
End: 2019-04-26

## 2019-04-26 DIAGNOSIS — Z02.1 PRE-EMPLOYMENT EXAMINATION: Primary | ICD-10-CM

## 2019-04-26 PROCEDURE — 99080 OSHA QUESTIONNAIRE: ICD-10-PCS | Mod: S$GLB,,, | Performed by: NURSE PRACTITIONER

## 2019-04-26 PROCEDURE — 97750 LIFT TEST: ICD-10-PCS | Mod: S$GLB,,, | Performed by: NURSE PRACTITIONER

## 2019-04-26 PROCEDURE — 99080 SPECIAL REPORTS OR FORMS: CPT | Mod: S$GLB,,, | Performed by: NURSE PRACTITIONER

## 2019-04-26 PROCEDURE — 80305 DRUG TEST PRSMV DIR OPT OBS: CPT | Mod: S$GLB,,, | Performed by: NURSE PRACTITIONER

## 2019-04-26 PROCEDURE — 99499 UNLISTED E&M SERVICE: CPT | Mod: S$GLB,,, | Performed by: NURSE PRACTITIONER

## 2019-04-26 PROCEDURE — 97750 PHYSICAL PERFORMANCE TEST: CPT | Mod: S$GLB,,, | Performed by: NURSE PRACTITIONER

## 2019-04-26 PROCEDURE — 94010 PULMONARY FUNCTION SCREENING (OCC MED PHYSICALS): ICD-10-PCS | Mod: S$GLB,,, | Performed by: NURSE PRACTITIONER

## 2019-04-26 PROCEDURE — 94010 BREATHING CAPACITY TEST: CPT | Mod: S$GLB,,, | Performed by: NURSE PRACTITIONER

## 2019-04-26 PROCEDURE — 99499 PHYSICAL, BASIC COMPLEXITY: ICD-10-PCS | Mod: S$GLB,,, | Performed by: NURSE PRACTITIONER

## 2019-04-26 PROCEDURE — 92552 PURE TONE AUDIOMETRY AIR: CPT | Mod: S$GLB,,, | Performed by: NURSE PRACTITIONER

## 2019-04-26 PROCEDURE — 80305 OOH NON-DOT DRUG SCREEN: ICD-10-PCS | Mod: S$GLB,,, | Performed by: NURSE PRACTITIONER

## 2019-04-26 PROCEDURE — 92552 AUDIOGRAM OCC MED: ICD-10-PCS | Mod: S$GLB,,, | Performed by: NURSE PRACTITIONER

## 2021-12-30 ENCOUNTER — OCCUPATIONAL HEALTH (OUTPATIENT)
Dept: URGENT CARE | Facility: CLINIC | Age: 46
End: 2021-12-30

## 2021-12-30 ENCOUNTER — TELEPHONE (OUTPATIENT)
Dept: URGENT CARE | Facility: CLINIC | Age: 46
End: 2021-12-30

## 2021-12-30 DIAGNOSIS — Z20.822 CLOSE EXPOSURE TO COVID-19 VIRUS: Primary | ICD-10-CM

## 2021-12-30 LAB
CTP QC/QA: YES
SARS-COV-2 RDRP RESP QL NAA+PROBE: POSITIVE

## 2021-12-30 PROCEDURE — U0002: ICD-10-PCS | Mod: QW,S$GLB,, | Performed by: PHYSICIAN ASSISTANT

## 2021-12-30 PROCEDURE — 99199 CV19 SPECIMEN HANDLING FEE / SWAB: ICD-10-PCS | Mod: S$GLB,,, | Performed by: PHYSICIAN ASSISTANT

## 2021-12-30 PROCEDURE — U0002 COVID-19 LAB TEST NON-CDC: HCPCS | Mod: QW,S$GLB,, | Performed by: PHYSICIAN ASSISTANT

## 2021-12-30 PROCEDURE — 99199 UNLISTED SPECIAL SVC PX/RPRT: CPT | Mod: S$GLB,,, | Performed by: PHYSICIAN ASSISTANT

## 2022-01-03 DIAGNOSIS — U07.1 COVID-19 VIRUS DETECTED: ICD-10-CM
